# Patient Record
Sex: MALE | Race: WHITE | NOT HISPANIC OR LATINO | Employment: FULL TIME | URBAN - METROPOLITAN AREA
[De-identification: names, ages, dates, MRNs, and addresses within clinical notes are randomized per-mention and may not be internally consistent; named-entity substitution may affect disease eponyms.]

---

## 2020-09-02 ENCOUNTER — OFFICE VISIT (OUTPATIENT)
Dept: NEUROSURGERY | Facility: CLINIC | Age: 60
End: 2020-09-02

## 2020-09-02 ENCOUNTER — DOCUMENTATION (OUTPATIENT)
Dept: NEUROSURGERY | Facility: CLINIC | Age: 60
End: 2020-09-02

## 2020-09-02 VITALS — WEIGHT: 170.6 LBS | BODY MASS INDEX: 23.11 KG/M2 | TEMPERATURE: 97.5 F | HEIGHT: 72 IN

## 2020-09-02 DIAGNOSIS — M51.36 DDD (DEGENERATIVE DISC DISEASE), LUMBAR: ICD-10-CM

## 2020-09-02 DIAGNOSIS — M47.819 FACET ARTHROPATHY: ICD-10-CM

## 2020-09-02 DIAGNOSIS — M54.9 MECHANICAL BACK PAIN: ICD-10-CM

## 2020-09-02 DIAGNOSIS — M54.16 LUMBAR RADICULOPATHY: Primary | ICD-10-CM

## 2020-09-02 PROCEDURE — 99243 OFF/OP CNSLTJ NEW/EST LOW 30: CPT | Performed by: NEUROLOGICAL SURGERY

## 2020-09-02 RX ORDER — GABAPENTIN 300 MG/1
300 CAPSULE ORAL 3 TIMES DAILY
Qty: 90 CAPSULE | Refills: 0 | OUTPATIENT
Start: 2020-09-02 | End: 2021-03-12

## 2020-09-02 RX ORDER — METOPROLOL SUCCINATE 100 MG/1
100 TABLET, EXTENDED RELEASE ORAL DAILY
COMMUNITY
Start: 2020-08-06

## 2020-09-02 RX ORDER — DOXAZOSIN 8 MG/1
8 TABLET ORAL
COMMUNITY
Start: 2020-08-06

## 2020-09-02 NOTE — PROGRESS NOTES
Patient: Obinna Willard  : 1960    Primary Care Provider: Last Bardales MD    Requesting Provider: As above        History    Chief Complaint: Low back and left leg pain with sensory alteration.    History of Present Illness: Mr. Willard is a 59-year-old  and  who has had chronic intermittent back problems.  A couple of months ago he had a flareup while twisting and that went away after few days with naproxen.  A month ago he bent over and had increased pain that has been knifelike in his back.  The pain will extend into the back of his left thigh.  Has had some tingling in the back of his left calf.  He is doing a little better.  At one point he was treated with muscle relaxants and steroids which were not helpful.  He also took some Percocet and Neurontin but stopped those medications.  He is better walking or lying flat.  He is worse with sitting.  He is off work.  Some of his work is heavy and awkward.    Review of Systems   Constitutional: Negative for activity change, appetite change, chills, diaphoresis, fatigue, fever and unexpected weight change.   HENT: Negative for congestion, dental problem, drooling, ear discharge, ear pain, facial swelling, hearing loss, mouth sores, nosebleeds, postnasal drip, rhinorrhea, sinus pressure, sinus pain, sneezing, sore throat, tinnitus, trouble swallowing and voice change.    Eyes: Negative for photophobia, pain, discharge, redness, itching and visual disturbance.   Respiratory: Negative for apnea, cough, choking, chest tightness, shortness of breath, wheezing and stridor.    Cardiovascular: Negative for chest pain, palpitations and leg swelling.   Gastrointestinal: Negative for abdominal distention, abdominal pain, anal bleeding, blood in stool, constipation, diarrhea, nausea, rectal pain and vomiting.   Endocrine: Negative for cold intolerance, heat intolerance, polydipsia, polyphagia and polyuria.   Genitourinary: Negative for  "decreased urine volume, difficulty urinating, discharge, dysuria, enuresis, flank pain, frequency, genital sores, hematuria, penile pain, penile swelling, scrotal swelling, testicular pain and urgency.   Musculoskeletal: Positive for back pain. Negative for arthralgias, gait problem, joint swelling, myalgias, neck pain and neck stiffness.   Skin: Negative for color change, pallor, rash and wound.   Allergic/Immunologic: Negative for environmental allergies, food allergies and immunocompromised state.   Neurological: Negative for dizziness, tremors, seizures, syncope, facial asymmetry, speech difficulty, weakness, light-headedness, numbness and headaches.   Hematological: Negative for adenopathy. Does not bruise/bleed easily.   Psychiatric/Behavioral: Negative for agitation, behavioral problems, confusion, decreased concentration, dysphoric mood, hallucinations, self-injury, sleep disturbance and suicidal ideas. The patient is not nervous/anxious and is not hyperactive.        The patient's past medical history, past surgical history, family history, and social history have been reviewed at length in the electronic medical record.    Physical Exam:   Temp 97.5 °F (36.4 °C)   Ht 182.9 cm (72\")   Wt 77.4 kg (170 lb 9.6 oz)   BMI 23.14 kg/m²   CONSTITUTIONAL: Patient is well-nourished, pleasant and appears stated age.  CV: Heart regular rate and rhythm without murmur, rub, or gallop.  PULMONARY: Lungs are clear to ascultation.  MUSCULOSKELETAL:  Straight leg raising is negative.  Chaparro's Sign is negative.  ROM in back normal.  Tenderness in the back to palpation is not observed.  NEUROLOGICAL:  Orientation, memory, attention span, language function, and cognition have been examined and are intact.  Strength is intact in the lower extremities to direct testing.  Muscle tone is normal throughout.  Station and gait are normal.  Sensation is intact to light touch testing throughout.  Deep tendon reflexes are 1+ and " symmetrical.  Coordination is intact.      Medical Decision Making    Data Review:   MRI of the lumbar spine dated 8/15/2020 demonstrates some degenerative disc disease and facet arthropathy.  There is a left-sided disc extrusion from L5-S1 slightly descends below the disc space.    Diagnosis:   1.  Left S1 radiculopathy secondary to disc protrusion.  2.  Lumbar degenerative disc disease.  3.  Lumbar degenerative joint disease.    Treatment Options:   I have referred the patient to physical therapy.  I would like him to start his Neurontin back at 300 mg 3 times a day.  He is to be off work until follow-up in our clinic in about 3.5 weeks.  I expect him to improve but given the degenerative changes in his back he is likely to have chronic recurring back symptoms.       Diagnosis Plan   1. Lumbar radiculopathy     2. Mechanical back pain     3. DDD (degenerative disc disease), lumbar     4. Facet arthropathy         Scribed for Sae Prince MD by Maricarmen Delgadillo CMA on 9/2/2020 12:52       I, Dr. Prince, personally performed the services described in the documentation, as scribed in my presence, and it is both accurate and complete.

## 2020-10-07 ENCOUNTER — TELEPHONE (OUTPATIENT)
Dept: NEUROSURGERY | Facility: CLINIC | Age: 60
End: 2020-10-07

## 2020-10-07 ENCOUNTER — OFFICE VISIT (OUTPATIENT)
Dept: NEUROSURGERY | Facility: CLINIC | Age: 60
End: 2020-10-07

## 2020-10-07 VITALS
SYSTOLIC BLOOD PRESSURE: 142 MMHG | WEIGHT: 172.6 LBS | RESPIRATION RATE: 16 BRPM | HEIGHT: 72 IN | TEMPERATURE: 97.1 F | DIASTOLIC BLOOD PRESSURE: 90 MMHG | OXYGEN SATURATION: 99 % | BODY MASS INDEX: 23.38 KG/M2 | HEART RATE: 70 BPM

## 2020-10-07 DIAGNOSIS — M54.16 LUMBAR RADICULOPATHY: Primary | ICD-10-CM

## 2020-10-07 DIAGNOSIS — M54.9 MECHANICAL BACK PAIN: ICD-10-CM

## 2020-10-07 DIAGNOSIS — M47.819 FACET ARTHROPATHY: ICD-10-CM

## 2020-10-07 DIAGNOSIS — M51.36 DDD (DEGENERATIVE DISC DISEASE), LUMBAR: ICD-10-CM

## 2020-10-07 PROCEDURE — 99214 OFFICE O/P EST MOD 30 MIN: CPT | Performed by: PHYSICIAN ASSISTANT

## 2020-10-07 NOTE — PROGRESS NOTES
Patient: Obinna Willard  : 1960  GENDER: male    Primary Care Provider: No primary care provider on file.    Requesting Provider: As above      History    Chief Complaint: left leg pain     History of Present Illness: Mr. Willard is a 59-year-old  and  who presented to our service with severe left hamstring pain that extended into his posterior calf and on occasion into his left foot.  Imaging findings demonstrated a leftward disc extrusion at L5-S1 that descended below the disc space.  He was felt to harbor a mild left S1 radiculopathy, and was referred to physical therapy for conservative management.  Since last seen in the office he has attended 9+ sessions of physical therapy with substantial improvement in his left lower leg complaints.  He additionally notes that gabapentin 300 mg TID has been quite beneficial.  He is requesting a refill today.  He continues to note discomfort with sustained sitting >30 minutes.  Symptoms do improve immediately upon standing/walking.  He continues to deny right lower extremity evolvement, bowel or bladder dysfunction or overt weakness.  He is not interested in pursuing surgical interventions, and would like to continue with conservative management.    Review of Systems    Past Medical History:   Diagnosis Date   • Cancer (CMS/HCC)     Basal Cell - Face   • Hearing loss    • Hypertension    • Low back pain      History reviewed. No pertinent surgical history.    Current Outpatient Medications:   •  doxazosin (CARDURA) 8 MG tablet, Take 8 mg by mouth every night at bedtime., Disp: , Rfl:   •  gabapentin (NEURONTIN) 300 MG capsule, Take 1 capsule by mouth 3 (Three) Times a Day. 1 nightly for 3 days, 1 twice a day for 3 days, 1 three times a day thereafter, Disp: 90 capsule, Rfl: 0  •  metoprolol succinate XL (TOPROL-XL) 100 MG 24 hr tablet, Take 100 mg by mouth Daily., Disp: , Rfl:     No Known Allergies    The patient's review of systems,  "past medical history, past surgical history, family history, and social history have been reviewed at length in the electronic medical record.    Physical Exam:   /90 (BP Location: Left arm)   Pulse 70   Temp 97.1 °F (36.2 °C)   Resp 16   Ht 182.9 cm (72\")   Wt 78.3 kg (172 lb 9.6 oz)   SpO2 99%   BMI 23.41 kg/m²   CONSTITUTIONAL:   - Patient is well-nourished  - Pleasant and appears stated age.  PSYCHIATRIC:  - Normal mood and affect  - Behavior is normal.  - Thought content is normal  HENT:   Head: Normocephalic and Atraumatic.   Eyes:     - Pupils are equal, round, and reactive to light.     - EOM are normal.   CV:   - Regular rate and rhythm on palpable radial pulse   - No murmur appreciated   PULMONARY:   - Speaking in full sentences  - No use of accessory muscles   - Breathing is non-labored   - No wheezing   SKIN:  - Clean, dry and intact   MUSCULOSKELETAL:  - Neck/Back tenderness to palpation is not observed.   - ROM in neck/back is normal.  - Straight leg raise is negative   - Chaparro's sign is negative   NEUROLOGICAL:  - A&Ox3  - Attention span, language function, and cognition are intact.  - Strength is intact in the upper and lower extremities to direct testing.  - Muscle tone is normal throughout.  - Station and gait are normal.  - Sensation is intact to light touch testing throughout.  - Deep tendon reflexes are 1+ and symmetrical.    - Guevara's Sign is negative bilaterally.  - No clonus is elicited.  - Coordination is intact.    Patient's Body mass index is 23.41 kg/m². BMI is within normal parameters. No follow-up required..         Medical Decision Making    Data Review:   1. MRI Lumbar Spine (8/15/2020): Demonstrates degenerative changes throughout with a left-sided disc extrusion at L5-S1 that descends below the disc space.    Diagnosis/Treatment Options:  1. Lumbar radiculopathy  2. Mechanical back pain  3. DDD (degenerative disc disease), lumbar  4. Facet arthropathy       Follow " up:  Mr. Willard is seen today in follow-up with improvement in his left lower leg complaints.  I have refilled his Neurontin 300 mg 3 times daily.  He will continue with conservative management and physical therapy and will be seen back in the office in 1 month for reassessment.  He will remain off work until then.  Hopefully at that time we will return him to regular duties.    Kate Fuller PA-C   10/7/2020   15:43 EDT

## 2020-11-06 ENCOUNTER — OFFICE VISIT (OUTPATIENT)
Dept: NEUROSURGERY | Facility: CLINIC | Age: 60
End: 2020-11-06

## 2020-11-06 VITALS
BODY MASS INDEX: 24.11 KG/M2 | HEIGHT: 72 IN | TEMPERATURE: 97.8 F | WEIGHT: 178 LBS | DIASTOLIC BLOOD PRESSURE: 78 MMHG | SYSTOLIC BLOOD PRESSURE: 146 MMHG

## 2020-11-06 DIAGNOSIS — M47.819 FACET ARTHROPATHY: ICD-10-CM

## 2020-11-06 DIAGNOSIS — M51.36 DDD (DEGENERATIVE DISC DISEASE), LUMBAR: ICD-10-CM

## 2020-11-06 DIAGNOSIS — M54.16 LUMBAR RADICULOPATHY: Primary | ICD-10-CM

## 2020-11-06 DIAGNOSIS — M54.9 MECHANICAL BACK PAIN: ICD-10-CM

## 2020-11-06 PROCEDURE — 99214 OFFICE O/P EST MOD 30 MIN: CPT | Performed by: PHYSICIAN ASSISTANT

## 2020-11-06 NOTE — PROGRESS NOTES
Patient: Obinna Willard  : 1960  GENDER: male    Primary Care Provider: No primary care provider on file.    Requesting Provider: As above      History    Chief Complaint: left leg pain    History of Present Illness: Mr. Willard is a 60-year-old  and  who presented to our service with severe left hamstring pain that extended into his posterior calf and on occasion into his left foot.  Imaging findings demonstrated a leftward disc extrusion at L5-S1 that descended below the disc space.  He was felt to harbor a mild left S1 radiculopathy, and was referred to physical therapy for conservative management.      Since last seen in the office he has attended 15+ sessions of physical therapy with substantial improvement in his left lower leg complaints.  He additionally notes that gabapentin 300 mg TID has been quite beneficial.  He continues to note discomfort with sustained sitting >30 minutes.  Symptoms do improve immediately upon standing/walking.  He continues to deny right lower extremity evolvement, bowel or bladder dysfunction or overt weakness.  He is not interested in pursuing surgical interventions, and would like to continue with conservative management.    Review of Systems   Neurological: Positive for numbness.   All other systems reviewed and are negative.      Past Medical History:   Diagnosis Date   • Cancer (CMS/HCC)     Basal Cell - Face   • Hearing loss    • Hypertension    • Low back pain      History reviewed. No pertinent surgical history.    Current Outpatient Medications:   •  doxazosin (CARDURA) 8 MG tablet, Take 8 mg by mouth every night at bedtime., Disp: , Rfl:   •  gabapentin (NEURONTIN) 300 MG capsule, Take 1 capsule by mouth 3 (Three) Times a Day. 1 nightly for 3 days, 1 twice a day for 3 days, 1 three times a day thereafter, Disp: 90 capsule, Rfl: 0  •  metoprolol succinate XL (TOPROL-XL) 100 MG 24 hr tablet, Take 100 mg by mouth Daily., Disp: , Rfl:  "    No Known Allergies    The patient's review of systems, past medical history, past surgical history, family history, and social history have been reviewed at length in the electronic medical record.    Physical Exam:   /78   Temp 97.8 °F (36.6 °C)   Ht 182.9 cm (72\")   Wt 80.7 kg (178 lb)   BMI 24.14 kg/m²   CONSTITUTIONAL:   - Patient is well-nourished  - Pleasant and appears stated age.  PSYCHIATRIC:  - Normal mood and affect  - Behavior is normal.  - Thought content is normal  HENT:   Head: Normocephalic and Atraumatic.   Eyes:     - Pupils are equal, round, and reactive to light.     - EOM are normal.   CV:   - Regular rate and rhythm on palpable radial pulse   - No murmur appreciated   PULMONARY:   - Speaking in full sentences  - No use of accessory muscles   - Breathing is non-labored   - No wheezing   SKIN:  - Clean, dry and intact   MUSCULOSKELETAL:  - Neck/Back tenderness to palpation is not observed.   - ROM in neck/back is normal.  - Straight leg raise is negative   - Chaparro's sign is negative   NEUROLOGICAL:  - A&Ox3  - Attention span, language function, and cognition are intact.  - Strength is intact in the upper and lower extremities to direct testing.  - Muscle tone is normal throughout.  - Station and gait are normal.  - Sensation is intact to light touch testing throughout.  - Deep tendon reflexes are 1+ and symmetrical.    - Guevara's Sign is negative bilaterally.  - No clonus is elicited.  - Coordination is intact.    Patient's Body mass index is 24.14 kg/m². BMI is within normal parameters. No follow-up required..         Medical Decision Making    Data Review:   1. MRI Lumbar Spine (8/15/2020): Demonstrates degenerative changes throughout with a left-sided disc extrusion at L5-S1 that descends below the disc space.    Diagnosis/Treatment Options:  1. Lumbar radiculopathy  2. Mechanical back pain  3. DDD (degenerative disc disease), lumbar  4. Facet arthropathy           Follow " up:  Mr. Willard is seen today in follow-up with continued improvement in his left lower leg complaints.  He will continue with conservative management and physical therapy and will be seen back in the office in 1 month for reassessment.  He will remain off work until then.  Hopefully at that time we will return him to regular duties.    Kate Fuller PA-C   11/6/2020   14:22 EST

## 2020-12-11 ENCOUNTER — OFFICE VISIT (OUTPATIENT)
Dept: NEUROSURGERY | Facility: CLINIC | Age: 60
End: 2020-12-11

## 2020-12-11 VITALS
BODY MASS INDEX: 24.43 KG/M2 | DIASTOLIC BLOOD PRESSURE: 90 MMHG | HEIGHT: 72 IN | SYSTOLIC BLOOD PRESSURE: 152 MMHG | WEIGHT: 180.4 LBS

## 2020-12-11 DIAGNOSIS — M47.819 FACET ARTHROPATHY: ICD-10-CM

## 2020-12-11 DIAGNOSIS — M54.9 MECHANICAL BACK PAIN: ICD-10-CM

## 2020-12-11 DIAGNOSIS — M54.16 LUMBAR RADICULOPATHY: Primary | ICD-10-CM

## 2020-12-11 DIAGNOSIS — M51.36 DDD (DEGENERATIVE DISC DISEASE), LUMBAR: ICD-10-CM

## 2020-12-11 PROCEDURE — 99213 OFFICE O/P EST LOW 20 MIN: CPT | Performed by: PHYSICIAN ASSISTANT

## 2020-12-11 NOTE — PROGRESS NOTES
Patient: Obinna Willard  : 1960  GENDER: male    Primary Care Provider: No primary care provider on file.    Requesting Provider: As above      History    Chief Complaint: left leg pain     History of Present Illness: Mr. Willard is a 60-year-old  and  who presented to our service with severe left hamstring pain that extended into his posterior calf and on occasion into his left foot.  Imaging findings demonstrated a leftward disc extrusion at L5-S1 that descended below the disc space.  He was felt to harbor a mild left S1 radiculopathy, and was referred to physical therapy for conservative management.       Throughout the course of his rehabilitation program, he attended 15+ sessions of physical therapy with substantial improvement in his left lower leg complaints.  Since last seen in the office he was formally discharged from physical therapy, and stepped down to a home exercise program.  His severe posterior left thigh and calf pain has resolved.  He has returned to his regular activities without interference.  He continues to note that gabapentin 300 mg TID has been quite beneficial.  He continues to deny right lower extremity evolvement, bowel or bladder dysfunction or overt weakness.  He has no other complaints at this time.    Review of Systems   Neurological: Positive for numbness.   All other systems reviewed and are negative.      Past Medical History:   Diagnosis Date   • Cancer (CMS/HCC)     Basal Cell - Face   • Hearing loss    • Hypertension    • Low back pain      History reviewed. No pertinent surgical history.    Current Outpatient Medications:   •  doxazosin (CARDURA) 8 MG tablet, Take 8 mg by mouth every night at bedtime., Disp: , Rfl:   •  gabapentin (NEURONTIN) 300 MG capsule, Take 1 capsule by mouth 3 (Three) Times a Day. 1 nightly for 3 days, 1 twice a day for 3 days, 1 three times a day thereafter, Disp: 90 capsule, Rfl: 0  •  metoprolol succinate XL  "(TOPROL-XL) 100 MG 24 hr tablet, Take 100 mg by mouth Daily., Disp: , Rfl:     No Known Allergies    The patient's review of systems, past medical history, past surgical history, family history, and social history have been reviewed at length in the electronic medical record.    Physical Exam:   /90 (BP Location: Right arm, Patient Position: Sitting, Cuff Size: Adult)   Ht 182.9 cm (72\")   Wt 81.8 kg (180 lb 6.4 oz)   BMI 24.47 kg/m²   CONSTITUTIONAL:   - Patient is well-nourished  - Pleasant and appears stated age.  PSYCHIATRIC:  - Normal mood and affect  - Behavior is normal.  - Thought content is normal  HENT:   Head: Normocephalic and Atraumatic.   Eyes:     - Pupils are equal, round, and reactive to light.     - EOM are normal.   CV:   - Regular rate and rhythm on palpable radial pulse   - No murmur appreciated   PULMONARY:   - Speaking in full sentences  - No use of accessory muscles   - Breathing is non-labored   - No wheezing   SKIN:  - Clean, dry and intact   MUSCULOSKELETAL:  - Neck/Back tenderness to palpation is not observed.   - ROM in neck/back is normal.  - Straight leg raise is negative   - Chaparro's sign is negative   NEUROLOGICAL:  - A&Ox3  - Attention span, language function, and cognition are intact.  - Strength is intact in the upper and lower extremities to direct testing.  - Muscle tone is normal throughout.  - Station and gait are normal.  - Sensation is intact to light touch testing throughout.  - Deep tendon reflexes are 1+ and symmetrical.    - Guevara's Sign is negative bilaterally.  - No clonus is elicited.  - Coordination is intact.    Patient's Body mass index is 24.47 kg/m². BMI is within normal parameters. No follow-up required..         Medical Decision Making    Data Review:   1. MRI Lumbar Spine (8/15/2020): Demonstrates degenerative changes throughout with a left-sided disc extrusion at L5-S1 that descends below the disc space.    Diagnosis/Treatment Options:  1. Lumbar " radiculopathy  2. Mechanical back pain  3. DDD (degenerative disc disease), lumbar  4. Facet arthropathy       Follow up:  Mr. Willard is seen today in follow-up with continued improvement in his left lower leg complaints.  Fortunately, his severe pain has resolved.  He is ready to return to regular work.  He has been released to regular duty from our office standpoint.  He will continue to observe, and will follow-up in the office in 2-2.5 months for reassessment.      Kate Fuller PA-C   12/11/2020   10:51 EST

## 2020-12-14 ENCOUNTER — TELEPHONE (OUTPATIENT)
Dept: NEUROSURGERY | Facility: CLINIC | Age: 60
End: 2020-12-14

## 2020-12-14 NOTE — TELEPHONE ENCOUNTER
PT CALLED STATING THAT HE WAS SEEN BY SARAH BEAN ON 12/11/2020 AND WAS RELEASE BACK TO WORK/PT STATES HE RECEIVED A NOTE THAT ONLY STATED HE COULD RETURN BACK TO WORK ON 12/14/2020 BUT DID NOT STATE WITHOUT RESTRICTIONS OR NO LIGHT DUTY.    THE RELEASE CAN BE FAXED WITH THE ADDITIONAL INFORMATION TO: [phone] (SAMIR DOMINGUEZ).    PT CAN BE REACHED AT: [phone].    THANK YOU!

## 2021-03-12 ENCOUNTER — OFFICE VISIT (OUTPATIENT)
Dept: NEUROSURGERY | Facility: CLINIC | Age: 61
End: 2021-03-12

## 2021-03-12 VITALS
HEIGHT: 72 IN | WEIGHT: 175 LBS | BODY MASS INDEX: 23.7 KG/M2 | TEMPERATURE: 97.5 F | DIASTOLIC BLOOD PRESSURE: 90 MMHG | SYSTOLIC BLOOD PRESSURE: 150 MMHG

## 2021-03-12 DIAGNOSIS — M54.16 LUMBAR RADICULOPATHY: Primary | ICD-10-CM

## 2021-03-12 DIAGNOSIS — M51.36 DDD (DEGENERATIVE DISC DISEASE), LUMBAR: ICD-10-CM

## 2021-03-12 DIAGNOSIS — M54.9 MECHANICAL BACK PAIN: ICD-10-CM

## 2021-03-12 DIAGNOSIS — M47.819 FACET ARTHROPATHY: ICD-10-CM

## 2021-03-12 PROCEDURE — 99213 OFFICE O/P EST LOW 20 MIN: CPT | Performed by: PHYSICIAN ASSISTANT

## 2021-03-12 RX ORDER — IBUPROFEN 600 MG/1
600 TABLET ORAL 2 TIMES DAILY PRN
Qty: 30 TABLET | Refills: 2 | Status: SHIPPED | OUTPATIENT
Start: 2021-03-12 | End: 2021-05-10

## 2021-03-12 NOTE — PROGRESS NOTES
Patient: Obinna Willard  : 1960  GENDER: male    Primary Care Provider: No primary care provider on file.    Requesting Provider: As above      History    Chief Complaint: low back and left leg pain     History of Present Illness: Mr. Willard is a 60-year-old  and  who presented to our service with severe left hamstring pain that extended into his posterior calf and on occasion into his left foot.  Imaging findings demonstrated a leftward disc extrusion at L5-S1 that descended below the disc space.  He was felt to harbor a mild left S1 radiculopathy, and was referred to physical therapy for conservative management.       Throughout the course of his rehabilitation program, he attended 15+ sessions of physical therapy with substantial improvement in his left lower leg complaints.  He stepped down to home exercise program, and has been continuing that regularly.  Since last in the office he returned to regular duty at work.  He has modified some of his work requirements to meet ergonomic correction needs.  This has proved highly effective in mitigating symptom severity especially in his low back.  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      Again, his severe posterior left thigh and calf pain has resolved.  He has returned to his regular activities without interference.  He discontinued his nightly gabapentin.  He continues to deny right lower extremity evolvement, bowel or bladder dysfunction or overt weakness.  He has no other complaints at this time.    Review of Systems   Constitutional: Negative for activity change, appetite change, chills, diaphoresis, fatigue, fever and unexpected weight change.   HENT: Negative for congestion, dental problem, drooling, ear discharge, ear pain, facial swelling, hearing loss, mouth sores, nosebleeds, postnasal drip, rhinorrhea, sinus pressure, sinus pain, sneezing, sore throat,  tinnitus, trouble swallowing and voice change.    Eyes: Negative for photophobia, pain, discharge, redness, itching and visual disturbance.   Respiratory: Negative for apnea, cough, choking, chest tightness, shortness of breath, wheezing and stridor.    Cardiovascular: Negative for chest pain, palpitations and leg swelling.   Gastrointestinal: Negative for abdominal distention, abdominal pain, anal bleeding, blood in stool, constipation, diarrhea, nausea, rectal pain and vomiting.   Endocrine: Negative for cold intolerance, heat intolerance, polydipsia, polyphagia and polyuria.   Genitourinary: Negative for decreased urine volume, difficulty urinating, discharge, dysuria, enuresis, flank pain, frequency, genital sores, hematuria, penile pain, penile swelling, scrotal swelling, testicular pain and urgency.   Musculoskeletal: Positive for back pain. Negative for arthralgias, gait problem, joint swelling, myalgias, neck pain and neck stiffness.   Skin: Negative for color change, pallor, rash and wound.   Allergic/Immunologic: Negative for environmental allergies, food allergies and immunocompromised state.   Neurological: Negative for dizziness, tremors, seizures, syncope, facial asymmetry, speech difficulty, weakness, light-headedness, numbness and headaches.   Hematological: Negative for adenopathy. Does not bruise/bleed easily.   Psychiatric/Behavioral: Negative for agitation, behavioral problems, confusion, decreased concentration, dysphoric mood, hallucinations, self-injury, sleep disturbance and suicidal ideas. The patient is not nervous/anxious and is not hyperactive.        Past Medical History:   Diagnosis Date   • Cancer (CMS/HCC)     Basal Cell - Face   • Hearing loss    • Hypertension    • Low back pain      Past Surgical History:   Procedure Laterality Date   • NO PAST SURGERIES         Current Outpatient Medications:   •  doxazosin (CARDURA) 8 MG tablet, Take 8 mg by mouth every night at bedtime., Disp: ,  "Rfl:   •  metoprolol succinate XL (TOPROL-XL) 100 MG 24 hr tablet, Take 100 mg by mouth Daily., Disp: , Rfl:   •  ibuprofen (ADVIL,MOTRIN) 600 MG tablet, Take 1 tablet by mouth 2 (Two) Times a Day As Needed for Mild Pain ., Disp: 30 tablet, Rfl: 2    No Known Allergies    The patient's review of systems, past medical history, past surgical history, family history, and social history have been reviewed at length in the electronic medical record.    Physical Exam:   /90 (BP Location: Right arm, Patient Position: Sitting, Cuff Size: Adult)   Temp 97.5 °F (36.4 °C)   Ht 182.9 cm (72.01\")   Wt 79.4 kg (175 lb)   BMI 23.73 kg/m²   CONSTITUTIONAL:   - Patient is well-nourished  - Pleasant and appears stated age.  PSYCHIATRIC:  - Normal mood and affect  - Behavior is normal.  - Thought content is normal  HENT:   Head: Normocephalic and Atraumatic.   Eyes:     - Pupils are equal, round, and reactive to light.     - EOM are normal.   CV:   - Regular rate and rhythm on palpable radial pulse   - No murmur appreciated   PULMONARY:   - Speaking in full sentences  - No use of accessory muscles   - Breathing is non-labored   - No wheezing   SKIN:  - Clean, dry and intact   MUSCULOSKELETAL:  - Neck/Back tenderness to palpation is not observed.   - ROM in neck/back is normal.  - Straight leg raise is negative   - Chaparro's sign is negative   NEUROLOGICAL:  - A&Ox3  - Attention span, language function, and cognition are intact.  - Strength is intact in the upper and lower extremities to direct testing.  - Muscle tone is normal throughout.  - Station and gait are normal.  - Sensation is intact to light touch testing throughout.  - Deep tendon reflexes are 1+ and symmetrical.    - Guevara's Sign is negative bilaterally.  - No clonus is elicited.  - Coordination is intact.    Patient's Body mass index is 23.73 kg/m². BMI is within normal parameters. No follow-up required..         Medical Decision Making    Data Review:   1. " MRI Lumbar Spine (8/15/2020): Demonstrates degenerative changes throughout with a left-sided disc extrusion at L5-S1 that descends below the disc space.      Diagnosis/Treatment Options:  1. Lumbar radiculopathy  2. Mechanical back pain  3. DDD (degenerative disc disease), lumbar  4. Facet arthropathy       Follow up:  Mr. Willard is seen today in follow-up with continued improvement in his left lower leg complaints.  Fortunately, his severe pain has resolved.    He will continue to observe moving forward and will follow-up in our office on an as-needed basis.       Kate Fuller PA-C   3/12/2021   12:10 EST

## 2021-05-10 RX ORDER — IBUPROFEN 600 MG/1
TABLET ORAL
Qty: 30 TABLET | Refills: 0 | Status: SHIPPED | OUTPATIENT
Start: 2021-05-10

## 2021-05-10 NOTE — TELEPHONE ENCOUNTER
Provider:  Suresh  Caller: Automated    Surgery:  n/a   Last visit:   3/12/21  Next visit: PRN    ARSLAN:         Reason for call:       Requested Prescriptions     Pending Prescriptions Disp Refills   • ibuprofen (ADVIL,MOTRIN) 600 MG tablet [Pharmacy Med Name: IBUPROFEN 600 MG TABLET] 30 tablet 2     Sig: TAKE 1 TABLET BY MOUTH TWICE A DAY AS NEEDED FOR MILD PAIN       Note added to pharmacy that ongoing refills to come from PCP or OTC since pt is being seen on PRN basis. If PA is not in agreement please remove note to pharmacy.